# Patient Record
Sex: FEMALE | Race: WHITE | NOT HISPANIC OR LATINO | Employment: FULL TIME | URBAN - METROPOLITAN AREA
[De-identification: names, ages, dates, MRNs, and addresses within clinical notes are randomized per-mention and may not be internally consistent; named-entity substitution may affect disease eponyms.]

---

## 2023-05-15 NOTE — PROGRESS NOTES
Subjective   Vandana Woods is a 62 y.o. female who presents for patient is here for physical exam.  HPI  62-year-old female new to me, patient of Dr. Pinto history of hypothyroidism generalized insomnia generalized allergies hypertension patient is here for follow-up become established and the patient voices no major minor complaints denies fever chills cough nausea vomiting. Recent screening blood work at  screening normal lipid   Review of Systems  10 system review pertinent as above  Objective     Visit Vitals  /80   Pulse 72   Temp 36.8 °C (98.2 °F)   Resp 16      Physical Exam  HEENT: Atraumatic normocephalic the pupils are equal and round and reactive to light the sclerae nonicteric extraocular motion are intact.  Neck: Is supple without JVD no carotid bruits the trachea is midline there are no masses pulses are equal and bilateral with normal upstroke.  Skin: Normal.  Skin good texture.  Moist.  Good turgor.  No lesions, no rashes.  Lymph: No lymphadenopathy appreciated, no masses, no lesions  Lungs: Are clear to auscultation and percussion, good breath sounds bilaterally, no rhonchi, no wheezing, good diaphragmatic excursion.  Heart: Normal rate and normal rhythm S1, S2, no S3, no gallop, murmur or rub.  Abdomen: Soft, nontender, no organomegaly, good bowel sounds.    Extremities: Full range of motion, good pulses bilateral.  No cyanosis, no clubbing or edema.  Neuro: Cranial nerves II-XII are grossly intact there is no sensory or motor deficits.  Able to move all extremities.      Assessment/Plan     Here for a physical    Fasting blood work    CBC BMP LIPID Vitamin D TSH    Hypothyroidism  Continue current medications  Report any changes such as weight gain or weight loss  Continue to exercise regularly      Prevention    Colonoscopy declined insurance ,Cologurd patient claims insurance would no pay  Mammogram needed tri pont June 28 /2022, req given for 07/2023  Bone density needed, done at   04/2023 shows osteopenia health fair     GYN needed  insurance would no cover per patient    Immunization, reviewed non epic    Hypertension  No added salt diet, do not and salt to your food  Try to exercise every other day for 30 minutes  Continue current medications    Tobacco abuse   Will need to stop smoking immediately   Consulted at great length greater than 10 min  Patient is not ready to quite  Problem List Items Addressed This Visit          Circulatory    Benign essential hypertension - Primary    Hypertension       Endocrine/Metabolic    Hypothyroidism    Relevant Orders    TSH       Other    Status post CVA    Tobacco abuse    Physical exam         Desmond Medina MD

## 2023-05-17 ENCOUNTER — OFFICE VISIT (OUTPATIENT)
Dept: PRIMARY CARE | Facility: CLINIC | Age: 63
End: 2023-05-17
Payer: COMMERCIAL

## 2023-05-17 VITALS
HEART RATE: 72 BPM | BODY MASS INDEX: 24.8 KG/M2 | WEIGHT: 140 LBS | DIASTOLIC BLOOD PRESSURE: 80 MMHG | RESPIRATION RATE: 16 BRPM | SYSTOLIC BLOOD PRESSURE: 122 MMHG | TEMPERATURE: 98.2 F | HEIGHT: 63 IN

## 2023-05-17 DIAGNOSIS — I10 BENIGN ESSENTIAL HYPERTENSION: ICD-10-CM

## 2023-05-17 DIAGNOSIS — F51.01 PRIMARY INSOMNIA: ICD-10-CM

## 2023-05-17 DIAGNOSIS — I10 BENIGN ESSENTIAL HYPERTENSION: Primary | ICD-10-CM

## 2023-05-17 DIAGNOSIS — Z72.0 TOBACCO ABUSE: ICD-10-CM

## 2023-05-17 DIAGNOSIS — Z00.00 PHYSICAL EXAM: ICD-10-CM

## 2023-05-17 DIAGNOSIS — Z12.31 ENCOUNTER FOR SCREENING MAMMOGRAM FOR MALIGNANT NEOPLASM OF BREAST: ICD-10-CM

## 2023-05-17 DIAGNOSIS — Z86.73 STATUS POST CVA: ICD-10-CM

## 2023-05-17 DIAGNOSIS — E03.9 HYPOTHYROIDISM, UNSPECIFIED TYPE: ICD-10-CM

## 2023-05-17 DIAGNOSIS — M85.872 OSTEOPENIA OF BOTH FEET: ICD-10-CM

## 2023-05-17 DIAGNOSIS — I10 HYPERTENSION, UNSPECIFIED TYPE: ICD-10-CM

## 2023-05-17 DIAGNOSIS — J30.2 SEASONAL ALLERGIES: ICD-10-CM

## 2023-05-17 DIAGNOSIS — E03.9 HYPOTHYROIDISM, UNSPECIFIED TYPE: Primary | ICD-10-CM

## 2023-05-17 DIAGNOSIS — M85.871 OSTEOPENIA OF BOTH FEET: ICD-10-CM

## 2023-05-17 LAB
CALCIDIOL (25 OH VITAMIN D3) (NG/ML) IN SER/PLAS: 29 NG/ML
THYROTROPIN (MIU/L) IN SER/PLAS BY DETECTION LIMIT <= 0.05 MIU/L: 0.16 MIU/L (ref 0.44–3.98)

## 2023-05-17 PROCEDURE — 84443 ASSAY THYROID STIM HORMONE: CPT

## 2023-05-17 PROCEDURE — 3074F SYST BP LT 130 MM HG: CPT | Performed by: INTERNAL MEDICINE

## 2023-05-17 PROCEDURE — 80048 BASIC METABOLIC PNL TOTAL CA: CPT | Performed by: INTERNAL MEDICINE

## 2023-05-17 PROCEDURE — 80061 LIPID PANEL: CPT | Performed by: INTERNAL MEDICINE

## 2023-05-17 PROCEDURE — 4004F PT TOBACCO SCREEN RCVD TLK: CPT | Performed by: INTERNAL MEDICINE

## 2023-05-17 PROCEDURE — 84443 ASSAY THYROID STIM HORMONE: CPT | Performed by: INTERNAL MEDICINE

## 2023-05-17 PROCEDURE — 99396 PREV VISIT EST AGE 40-64: CPT | Performed by: INTERNAL MEDICINE

## 2023-05-17 PROCEDURE — 3079F DIAST BP 80-89 MM HG: CPT | Performed by: INTERNAL MEDICINE

## 2023-05-17 PROCEDURE — 36415 COLL VENOUS BLD VENIPUNCTURE: CPT

## 2023-05-17 PROCEDURE — 82306 VITAMIN D 25 HYDROXY: CPT

## 2023-05-17 PROCEDURE — 85025 COMPLETE CBC W/AUTO DIFF WBC: CPT | Performed by: INTERNAL MEDICINE

## 2023-05-17 PROCEDURE — 82306 VITAMIN D 25 HYDROXY: CPT | Performed by: INTERNAL MEDICINE

## 2023-05-17 RX ORDER — MONTELUKAST SODIUM 10 MG/1
10 TABLET ORAL DAILY
Qty: 30 TABLET | Refills: 11 | Status: SHIPPED | OUTPATIENT
Start: 2023-05-17 | End: 2024-05-01 | Stop reason: SDUPTHER

## 2023-05-17 RX ORDER — MONTELUKAST SODIUM 10 MG/1
1 TABLET ORAL DAILY
COMMUNITY
End: 2023-05-17 | Stop reason: SDUPTHER

## 2023-05-17 RX ORDER — MIRTAZAPINE 15 MG/1
15 TABLET, FILM COATED ORAL NIGHTLY
Qty: 30 TABLET | Refills: 11 | Status: SHIPPED | OUTPATIENT
Start: 2023-05-17 | End: 2024-05-01 | Stop reason: SDUPTHER

## 2023-05-17 RX ORDER — MIRTAZAPINE 15 MG/1
1 TABLET, FILM COATED ORAL NIGHTLY
COMMUNITY
Start: 2016-06-20 | End: 2023-05-17 | Stop reason: SDUPTHER

## 2023-05-17 RX ORDER — LEVOTHYROXINE SODIUM 125 UG/1
1 TABLET ORAL DAILY
COMMUNITY
End: 2023-05-17 | Stop reason: SDUPTHER

## 2023-05-17 RX ORDER — LEVOTHYROXINE SODIUM 125 UG/1
125 TABLET ORAL DAILY
Qty: 30 TABLET | Refills: 11 | Status: SHIPPED | OUTPATIENT
Start: 2023-05-17 | End: 2023-05-25 | Stop reason: ALTCHOICE

## 2023-05-17 RX ORDER — TRIAMTERENE AND HYDROCHLOROTHIAZIDE 37.5; 25 MG/1; MG/1
1 CAPSULE ORAL DAILY
Qty: 30 CAPSULE | Refills: 11 | Status: SHIPPED | OUTPATIENT
Start: 2023-05-17 | End: 2024-05-01 | Stop reason: SDUPTHER

## 2023-05-17 RX ORDER — LORATADINE 10 MG/1
1 TABLET ORAL DAILY
COMMUNITY
Start: 2022-05-27 | End: 2024-05-01 | Stop reason: SDUPTHER

## 2023-05-17 RX ORDER — TRIAMTERENE AND HYDROCHLOROTHIAZIDE 37.5; 25 MG/1; MG/1
1 CAPSULE ORAL DAILY
COMMUNITY
Start: 2014-10-17 | End: 2023-05-17 | Stop reason: SDUPTHER

## 2023-05-17 ASSESSMENT — PAIN SCALES - GENERAL: PAINLEVEL: 0-NO PAIN

## 2023-05-22 ENCOUNTER — TELEPHONE (OUTPATIENT)
Dept: PRIMARY CARE | Facility: CLINIC | Age: 63
End: 2023-05-22
Payer: COMMERCIAL

## 2023-05-24 NOTE — TELEPHONE ENCOUNTER
Informed pt of her blood test results, I informed her that she is to return in 6 months to repeat her blood test results. She has some concerns stating her insurance will not allow her to come back that soon, she would like to speak with you regarding her thyroid levels. Please call tomorrow before 2:30 if possible.

## 2023-05-25 DIAGNOSIS — E03.9 HYPOTHYROIDISM, UNSPECIFIED TYPE: ICD-10-CM

## 2023-05-25 RX ORDER — LEVOTHYROXINE SODIUM 112 UG/1
112 TABLET ORAL DAILY
Qty: 30 TABLET | Refills: 11 | Status: SHIPPED | OUTPATIENT
Start: 2023-05-25 | End: 2024-05-01 | Stop reason: SDUPTHER

## 2023-11-09 ENCOUNTER — TELEMEDICINE (OUTPATIENT)
Dept: PRIMARY CARE | Facility: CLINIC | Age: 63
End: 2023-11-09
Payer: COMMERCIAL

## 2023-11-09 DIAGNOSIS — J01.00 ACUTE NON-RECURRENT MAXILLARY SINUSITIS: Primary | ICD-10-CM

## 2023-11-09 PROCEDURE — 99213 OFFICE O/P EST LOW 20 MIN: CPT | Performed by: INTERNAL MEDICINE

## 2023-11-09 RX ORDER — AZITHROMYCIN 250 MG/1
TABLET, FILM COATED ORAL
Qty: 6 TABLET | Refills: 0 | Status: SHIPPED | OUTPATIENT
Start: 2023-11-09 | End: 2023-11-14

## 2023-11-09 ASSESSMENT — ENCOUNTER SYMPTOMS: COUGH: 1

## 2023-11-09 NOTE — PROGRESS NOTES
Subjective   Vandana Woods is a 63 y.o. female who presents for complaining of sinus congestion.  Cough  The current episode started in the past 7 days. The problem has been unchanged. The problem occurs hourly. The cough is Non-productive.     Vandana is a 63 female with a history of hypothyroidism, tobacco abuse, complaining of sinus congestion for the last 2 weeks pressure in the sinus cavity, discharge clear discharge, occasional cough without fever chills no body aches no nausea vomiting no diaphoresis no abdominal pain no constipation diarrhea.  In addition patient Synthroid dose was decreased to 112 mcg daily, she is feeling fine on this new dose.  Review of Systems   Respiratory:  Positive for cough.      10 systems reviewed pertinent as above  Objective   Virtual visit  There were no vitals taken for this visit.   Physical Exam  Virtual visit      Assessment/Plan     Virtual visit    Acute sinusitis  Sinus congestion postnasal drip  With associated cough  In the setting of tobacco abuse  I suspect that patient may have progressed to bacterial bronchitis  I also suspect this patient may have developed bacterial sinusitis  Currently probably most consistent with viral  I will start the patient on azithromycin as directed  Fluids rest Tylenol if fever or chills  Call if not better    Hypothyroidism  Continue current medications  Report any changes such as weight gain or weight loss  Continue to exercise regularly  Discussed new dose of Synthroid of 112 mcg  Patient will come in in 4 weeks for a new TSH level    Tobacco abuse  Discussed smoking cessation with patient  Patient is not ready to quit smoking at this time      Problem List Items Addressed This Visit    None  Visit Diagnoses       Acute non-recurrent maxillary sinusitis    -  Primary    Relevant Medications    azithromycin (Zithromax) 250 mg tablet              Desmond Medina MD

## 2023-11-10 ENCOUNTER — HOSPITAL ENCOUNTER (OUTPATIENT)
Dept: RADIOLOGY | Facility: HOSPITAL | Age: 63
Discharge: HOME | End: 2023-11-10
Payer: COMMERCIAL

## 2023-11-10 ENCOUNTER — DOCUMENTATION (OUTPATIENT)
Dept: PRIMARY CARE | Facility: CLINIC | Age: 63
End: 2023-11-10
Payer: COMMERCIAL

## 2023-11-10 VITALS — HEIGHT: 65 IN | BODY MASS INDEX: 22.49 KG/M2 | WEIGHT: 135 LBS

## 2023-11-10 DIAGNOSIS — Z12.31 ENCOUNTER FOR SCREENING MAMMOGRAM FOR MALIGNANT NEOPLASM OF BREAST: ICD-10-CM

## 2023-11-10 PROCEDURE — 77063 BREAST TOMOSYNTHESIS BI: CPT

## 2023-12-06 NOTE — PROGRESS NOTES
Subjective   Vandana Woods is a 63 y.o. female who presents for complaining of sinus congestion.    Vandana is a 63 female with a history of hypothyroidism, tobacco abuse, complaining of sinus congestion for the last 2 weeks pressure in the sinus cavity, discharge clear discharge, occasional cough without fever chills no body aches no nausea vomiting no diaphoresis no abdominal pain no constipation diarrhea.  In addition patient Synthroid dose was decreased to 112 mcg daily, she is feeling fine on this new dose.    ROS:  10 systems are pertinent as above    10 systems reviewed pertinent as above  Objective     Visit Vitals  /78   Pulse 78   Temp 36.5 °C (97.7 °F)   Resp 16      Physical Exam  HEENT: Atraumatic normocephalic the pupils are equal and round and reactive to light the sclerae nonicteric extraocular motion are intact.  Neck: Is supple without JVD no carotid bruits the trachea is midline there are no masses pulses are equal and bilateral with normal upstroke.  Skin: Normal.  Skin good texture.  Moist.  Good turgor.  No lesions, no rashes.  Lymph: No lymphadenopathy appreciated, no masses, no lesions  Lungs: Are clear to auscultation and percussion, good breath sounds bilaterally, no rhonchi, no wheezing, good diaphragmatic excursion.  Heart: Normal rate and normal rhythm S1, S2, no S3, no gallop, murmur or rub.  Abdomen: Soft, nontender, no organomegaly, good bowel sounds.    Extremities: Full range of motion, good pulses bilateral.  No cyanosis, no clubbing or edema.  Neuro: Cranial nerves II-XII are grossly intact there is no sensory or motor deficits.  Able to move all extremities.      Assessment/Plan     Patient is here for follow-up       Hypothyroidism  Continue current medications  Report any changes such as weight gain or weight loss  Continue to exercise regularly  Discussed new dose of Synthroid of 112 mcg  Patient will come in in 4 weeks for a new TSH level    Tobacco abuse  Discussed  smoking cessation with patient  Patient is not ready to quit smoking at this time    Post nasal drip sinusitis  Must stop smoking       Problem List Items Addressed This Visit       Hypothyroidism - Primary    Relevant Orders    TSH       Desmond Medina MD

## 2023-12-11 ENCOUNTER — OFFICE VISIT (OUTPATIENT)
Dept: PRIMARY CARE | Facility: CLINIC | Age: 63
End: 2023-12-11
Payer: COMMERCIAL

## 2023-12-11 VITALS
BODY MASS INDEX: 23.66 KG/M2 | HEIGHT: 65 IN | RESPIRATION RATE: 16 BRPM | SYSTOLIC BLOOD PRESSURE: 120 MMHG | WEIGHT: 142 LBS | TEMPERATURE: 97.7 F | HEART RATE: 78 BPM | DIASTOLIC BLOOD PRESSURE: 78 MMHG

## 2023-12-11 DIAGNOSIS — E03.9 HYPOTHYROIDISM, UNSPECIFIED TYPE: Primary | ICD-10-CM

## 2023-12-11 PROCEDURE — 3074F SYST BP LT 130 MM HG: CPT | Performed by: INTERNAL MEDICINE

## 2023-12-11 PROCEDURE — 36415 COLL VENOUS BLD VENIPUNCTURE: CPT

## 2023-12-11 PROCEDURE — 99214 OFFICE O/P EST MOD 30 MIN: CPT | Performed by: INTERNAL MEDICINE

## 2023-12-11 PROCEDURE — 4004F PT TOBACCO SCREEN RCVD TLK: CPT | Performed by: INTERNAL MEDICINE

## 2023-12-11 PROCEDURE — 84443 ASSAY THYROID STIM HORMONE: CPT

## 2023-12-11 PROCEDURE — 3078F DIAST BP <80 MM HG: CPT | Performed by: INTERNAL MEDICINE

## 2023-12-11 ASSESSMENT — ENCOUNTER SYMPTOMS
LOSS OF SENSATION IN FEET: 0
OCCASIONAL FEELINGS OF UNSTEADINESS: 0
DEPRESSION: 0

## 2023-12-11 ASSESSMENT — PAIN SCALES - GENERAL: PAINLEVEL: 0-NO PAIN

## 2023-12-12 LAB — TSH SERPL-ACNC: 2.05 MIU/L (ref 0.44–3.98)

## 2023-12-29 ENCOUNTER — TELEMEDICINE (OUTPATIENT)
Dept: PRIMARY CARE | Facility: CLINIC | Age: 63
End: 2023-12-29
Payer: COMMERCIAL

## 2023-12-29 DIAGNOSIS — J20.9 ACUTE BRONCHITIS, UNSPECIFIED ORGANISM: Primary | ICD-10-CM

## 2023-12-29 PROCEDURE — 99213 OFFICE O/P EST LOW 20 MIN: CPT | Performed by: INTERNAL MEDICINE

## 2023-12-29 RX ORDER — AZITHROMYCIN 250 MG/1
TABLET, FILM COATED ORAL
Qty: 6 TABLET | Refills: 1 | Status: SHIPPED | OUTPATIENT
Start: 2023-12-29 | End: 2024-01-03

## 2023-12-29 ASSESSMENT — ENCOUNTER SYMPTOMS
WEIGHT LOSS: 0
HEADACHES: 1
WHEEZING: 1
RHINORRHEA: 0
HEMOPTYSIS: 0
SHORTNESS OF BREATH: 0
SWEATS: 0
MYALGIAS: 1
COUGH: 1
HEARTBURN: 0
SORE THROAT: 0
CHILLS: 0
FEVER: 0

## 2024-04-29 NOTE — PROGRESS NOTES
Subjective   Vandana Woods is a 63 y.o. female who presents for physical exam fasting blood work .    Vandana is a 63 female with a history of hypothyroidism, tobacco abuse, complaining of sinus congestion for the last 2 weeks pressure in the sinus cavity, discharge clear discharge, occasional cough without fever chills no body aches no nausea vomiting no diaphoresis no abdominal pain no constipation diarrhea. On  Synthroid dose was decreased to 112 mcg daily, she is feeling fine on this new dose. Here for a physical    ROS:  10 systems are pertinent as above    10 systems reviewed pertinent as above  Objective     There were no vitals taken for this visit.     Physical Exam  HEENT: Atraumatic normocephalic the pupils are equal and round and reactive to light the sclerae nonicteric extraocular motion are intact.  Neck: Is supple without JVD no carotid bruits the trachea is midline there are no masses pulses are equal and bilateral with normal upstroke.  Skin: Normal.  Skin good texture.  Moist.  Good turgor.  No lesions, no rashes.  Lymph: No lymphadenopathy appreciated, no masses, no lesions  Lungs: Are clear to auscultation and percussion, good breath sounds bilaterally, no rhonchi, no wheezing, good diaphragmatic excursion.  Heart: Normal rate and normal rhythm S1, S2, no S3, no gallop, murmur or rub.  Abdomen: Soft, nontender, no organomegaly, good bowel sounds.    Extremities: Full range of motion, good pulses bilateral.  No cyanosis, no clubbing or edema.  Neuro: Cranial nerves II-XII are grossly intact there is no sensory or motor deficits.  Able to move all extremities.      Assessment/Plan     Patient is here for physical exam     Fasting blood work    Cbc bmp lipid ast alt vit d  TSH    Prevention  Colonoscopy decline future  Mammogram 2023 nov    Gyn  Referral     Declined CT Lung Cancer screening     Hypothyroidism  Continue current medications  Report any changes such as weight gain or weight  loss  Continue to exercise regularly  Discussed new dose of Synthroid of 112 mcg  Patient will come in in 4 weeks for a new TSH level    Tobacco abuse  Discussed smoking cessation with patient  Patient is not ready to quit smoking at this time      Problem List Items Addressed This Visit       Benign essential hypertension    Hypertension - Primary    Hypothyroidism    Relevant Medications    levothyroxine (Synthroid, Levoxyl) 112 mcg tablet     Other Visit Diagnoses       Primary insomnia        Relevant Medications    mirtazapine (Remeron) 15 mg tablet    Seasonal allergies        Relevant Medications    montelukast (Singulair) 10 mg tablet    loratadine (Claritin) 10 mg tablet              Desmond Medina MD

## 2024-05-01 ENCOUNTER — OFFICE VISIT (OUTPATIENT)
Dept: OTOLARYNGOLOGY | Facility: CLINIC | Age: 64
End: 2024-05-01
Payer: COMMERCIAL

## 2024-05-01 ENCOUNTER — OFFICE VISIT (OUTPATIENT)
Dept: PRIMARY CARE | Facility: CLINIC | Age: 64
End: 2024-05-01
Payer: COMMERCIAL

## 2024-05-01 VITALS — HEIGHT: 65 IN | BODY MASS INDEX: 23.32 KG/M2 | WEIGHT: 140 LBS

## 2024-05-01 VITALS — WEIGHT: 138 LBS | BODY MASS INDEX: 22.99 KG/M2 | HEIGHT: 65 IN

## 2024-05-01 DIAGNOSIS — E03.9 HYPOTHYROIDISM, UNSPECIFIED TYPE: ICD-10-CM

## 2024-05-01 DIAGNOSIS — E78.5 DYSLIPIDEMIA: ICD-10-CM

## 2024-05-01 DIAGNOSIS — I10 HYPERTENSION, UNSPECIFIED TYPE: Primary | ICD-10-CM

## 2024-05-01 DIAGNOSIS — F51.01 PRIMARY INSOMNIA: ICD-10-CM

## 2024-05-01 DIAGNOSIS — H61.20 CERUMEN IN AUDITORY CANAL ON EXAMINATION: Primary | ICD-10-CM

## 2024-05-01 DIAGNOSIS — J30.2 SEASONAL ALLERGIES: ICD-10-CM

## 2024-05-01 DIAGNOSIS — I10 BENIGN ESSENTIAL HYPERTENSION: ICD-10-CM

## 2024-05-01 LAB
APPEARANCE UR: CLEAR
BILIRUB UR QL STRIP: NEGATIVE
COLOR UR: YELLOW
GLUCOSE UR STRIP-MCNC: NEGATIVE MG/DL
HGB UR QL STRIP: ABNORMAL
KETONES UR STRIP-MCNC: NEGATIVE MG/DL
LEUKOCYTE ESTERASE UR QL STRIP: ABNORMAL
NITRITE UR QL STRIP: NEGATIVE
PH UR STRIP: 7 [PH]
PROT UR STRIP-MCNC: NEGATIVE MG/DL
SP GR UR STRIP.AUTO: 1.01
UROBILINOGEN UR STRIP-ACNC: 0.2 E.U./DL

## 2024-05-01 PROCEDURE — 99213 OFFICE O/P EST LOW 20 MIN: CPT | Performed by: OTOLARYNGOLOGY

## 2024-05-01 PROCEDURE — 84450 TRANSFERASE (AST) (SGOT): CPT | Performed by: INTERNAL MEDICINE

## 2024-05-01 PROCEDURE — 81003 URINALYSIS AUTO W/O SCOPE: CPT | Performed by: INTERNAL MEDICINE

## 2024-05-01 PROCEDURE — 84443 ASSAY THYROID STIM HORMONE: CPT | Performed by: INTERNAL MEDICINE

## 2024-05-01 PROCEDURE — 99396 PREV VISIT EST AGE 40-64: CPT | Performed by: INTERNAL MEDICINE

## 2024-05-01 PROCEDURE — 80061 LIPID PANEL: CPT | Performed by: INTERNAL MEDICINE

## 2024-05-01 PROCEDURE — 85025 COMPLETE CBC W/AUTO DIFF WBC: CPT | Performed by: INTERNAL MEDICINE

## 2024-05-01 PROCEDURE — 84460 ALANINE AMINO (ALT) (SGPT): CPT | Performed by: INTERNAL MEDICINE

## 2024-05-01 RX ORDER — MIRTAZAPINE 15 MG/1
15 TABLET, FILM COATED ORAL NIGHTLY
Qty: 90 TABLET | Refills: 3 | Status: SHIPPED | OUTPATIENT
Start: 2024-05-01 | End: 2025-05-01

## 2024-05-01 RX ORDER — MONTELUKAST SODIUM 10 MG/1
10 TABLET ORAL DAILY
Qty: 90 TABLET | Refills: 3 | Status: SHIPPED | OUTPATIENT
Start: 2024-05-01 | End: 2025-05-01

## 2024-05-01 RX ORDER — TRIAMTERENE AND HYDROCHLOROTHIAZIDE 37.5; 25 MG/1; MG/1
1 CAPSULE ORAL DAILY
Qty: 30 CAPSULE | Refills: 11 | Status: SHIPPED | OUTPATIENT
Start: 2024-05-01

## 2024-05-01 RX ORDER — LORATADINE 10 MG/1
10 TABLET ORAL DAILY
Qty: 90 TABLET | Refills: 3 | Status: SHIPPED | OUTPATIENT
Start: 2024-05-01 | End: 2025-05-01

## 2024-05-01 RX ORDER — LEVOTHYROXINE SODIUM 112 UG/1
112 TABLET ORAL DAILY
Qty: 90 TABLET | Refills: 3 | Status: SHIPPED | OUTPATIENT
Start: 2024-05-01 | End: 2025-05-01

## 2024-05-01 ASSESSMENT — ENCOUNTER SYMPTOMS
DEPRESSION: 0
LOSS OF SENSATION IN FEET: 0
OCCASIONAL FEELINGS OF UNSTEADINESS: 0

## 2024-05-01 NOTE — TELEPHONE ENCOUNTER
Pt is requesting a call back and  is refusing to  prescription for synthroid because she said she saw her results were in and she feels that synthroid was too low so she wanted a call back to verify if she should still get it due to the results being low.

## 2024-05-01 NOTE — PROGRESS NOTES
NATHANIEL  Vandana Woods is a 63 y.o. female here for annual cerumen management.  Denies otalgia and otorrhea.  Generally normal hearing at baseline.  Complains of ear itching      Past Medical History:   Diagnosis Date    Abnormal findings on diagnostic imaging of other specified body structures 04/15/2014    Abnormal MRI    Altered mental status, unspecified 06/20/2016    Abnormal mental state    Disease of spinal cord, unspecified (Multi)     Myelopathy    Encounter for screening, unspecified     Screening due    Epidermal cyst     Epidermal cyst of ear    Other bursitis, not elsewhere classified, unspecified site 06/30/2017    Traumatic bursitis    Other conditions influencing health status     Herniated disc    Other specified noninfective gastroenteritis and colitis     Lymphocytic colitis    Pain in right wrist 06/30/2017    Wrist pain, acute, right    Pain in throat 06/27/2014    Pain in throat    Personal history of other diseases of the digestive system     History of irritable bowel syndrome    Personal history of other diseases of the musculoskeletal system and connective tissue     History of low back pain    Personal history of other diseases of the musculoskeletal system and connective tissue     History of low back pain    Personal history of other diseases of the musculoskeletal system and connective tissue     History of radiculopathy    Personal history of other diseases of the musculoskeletal system and connective tissue     Personal history of fibromyalgia    Personal history of other diseases of the musculoskeletal system and connective tissue     Personal history of osteoporosis    Personal history of other diseases of the nervous system and sense organs     History of neuropathy    Personal history of other diseases of the nervous system and sense organs 03/28/2016    History of impacted cerumen    Personal history of other diseases of the respiratory system     History of acute bronchitis     "Personal history of other diseases of the respiratory system     History of chronic obstructive lung disease    Personal history of other diseases of the respiratory system     History of allergic rhinitis    Personal history of other diseases of the respiratory system     Personal history of sinusitis    Personal history of other diseases of the respiratory system     Personal history of asthma    Personal history of other diseases of the respiratory system 10/26/2015    History of acute bronchitis    Personal history of other diseases of the respiratory system 10/21/2015    History of sore throat    Personal history of other endocrine, nutritional and metabolic disease     History of hyperlipidemia    Personal history of other endocrine, nutritional and metabolic disease     History of hypothyroidism    Personal history of other specified conditions     History of diarrhea    Personal history of other specified conditions 02/26/2014    History of headache    Personal history of other specified conditions 06/27/2014    History of dizziness            Medications:     Current Outpatient Medications:     levothyroxine (Synthroid, Levoxyl) 112 mcg tablet, Take 1 tablet (112 mcg) by mouth once daily., Disp: 90 tablet, Rfl: 3    loratadine (Claritin) 10 mg tablet, Take 1 tablet (10 mg) by mouth once daily., Disp: 90 tablet, Rfl: 3    mirtazapine (Remeron) 15 mg tablet, Take 1 tablet (15 mg) by mouth once daily at bedtime., Disp: 90 tablet, Rfl: 3    montelukast (Singulair) 10 mg tablet, Take 1 tablet (10 mg) by mouth once daily., Disp: 90 tablet, Rfl: 3    triamterene-hydrochlorothiazid (Dyazide) 37.5-25 mg capsule, Take 1 capsule by mouth once daily., Disp: 30 capsule, Rfl: 11     Allergies:  Allergies   Allergen Reactions    Cefaclor Rash        Physical Exam:  Last Recorded Vitals  Height 1.638 m (5' 4.5\"), weight 62.6 kg (138 lb).  General:     General appearance: Well-developed, well-nourished in no acute " distress.       Voice:  normal       Head/face: Normal appearance; nontender to palpation     Facial nerve function: Normal and symmetric bilaterally.    Oral/oropharynx:     Oral vestibule: Normal labial and gingival mucosa     Tongue/floor of mouth: Normal without lesion     Oropharynx: Clear.  No lesions present of the hard/soft palate, posterior pharynx    Neck:     Neck: Normal appearance, trachea midline     Salivary glands: Normal to palpation bilaterally     Lymph nodes: No cervical lymphadenopathy to palpation     Thyroid: No thyromegaly.  No palpable nodules     Range of motion: Normal    Neurological:     Cortical functions: Alert and oriented x3, appropriate affect       Larynx/hypopharynx:     Laryngeal findings: Mirror exam inadequate or limited secondary to enlarged base of tongue and/or excessive gagging    Ear:     Ear canal: Normal bilaterally after suctioning moderately occlusive cerumen bilaterally     Tympanic membrane: Intact and mobile bilaterally     Pinna: Normal bilaterally     Hearing:  Gross hearing assessment normal by voice    Nose:     Visualized using: Anterior rhinoscopy     Nasopharynx: Inadequate mirror exam secondary to gag, anatomy.       Nasal dorsum: Nontraumatic midline appearance     Septum: Midline     Inferior turbinates: Normally sized     Mucosa: Bilateral, pink, normal appearing       ASSESSMENT/PLAN:  Ears cleaned bilaterally.  Relief.  Offered fluocinolone oil.  She would like to defer.  Recheck 6 months, sooner as needed      Sean German MD

## 2024-05-01 NOTE — TELEPHONE ENCOUNTER
Pt stated that Dr forgot to fill triamterene-hydrochlorothiazid (Dyazide) 37.5-25 mg capsule [[id number]], she requested it to be sent to [facility] on file.

## 2024-05-02 ENCOUNTER — TELEPHONE (OUTPATIENT)
Dept: PRIMARY CARE | Facility: CLINIC | Age: 64
End: 2024-05-02
Payer: COMMERCIAL

## 2024-05-02 DIAGNOSIS — I10 HYPERTENSION, UNSPECIFIED TYPE: Primary | ICD-10-CM

## 2024-05-02 NOTE — TELEPHONE ENCOUNTER
Pt is requesting aphone call regarding her results from yesterday. Pt advised me that she is not going to  her rx for Synthroid until she gets a confirmation on if her levels are okay or not.

## 2024-06-14 ENCOUNTER — TELEPHONE (OUTPATIENT)
Dept: PRIMARY CARE | Facility: CLINIC | Age: 64
End: 2024-06-14
Payer: COMMERCIAL
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
## 2024-08-06 ENCOUNTER — APPOINTMENT (OUTPATIENT)
Dept: PRIMARY CARE | Facility: CLINIC | Age: 64
End: 2024-08-06
Payer: COMMERCIAL

## 2024-08-06 DIAGNOSIS — M54.42 ACUTE LEFT-SIDED LOW BACK PAIN WITH LEFT-SIDED SCIATICA: Primary | ICD-10-CM

## 2024-08-06 DIAGNOSIS — E03.9 HYPOTHYROIDISM, UNSPECIFIED TYPE: ICD-10-CM

## 2024-08-06 PROCEDURE — 99214 OFFICE O/P EST MOD 30 MIN: CPT | Performed by: INTERNAL MEDICINE

## 2024-08-06 RX ORDER — DICLOFENAC SODIUM 75 MG/1
75 TABLET, DELAYED RELEASE ORAL 2 TIMES DAILY
Qty: 60 TABLET | Refills: 0 | Status: SHIPPED | OUTPATIENT
Start: 2024-08-06 | End: 2024-09-05

## 2024-08-06 RX ORDER — METHYLPREDNISOLONE 4 MG/1
TABLET ORAL
Qty: 21 TABLET | Refills: 0 | Status: SHIPPED | OUTPATIENT
Start: 2024-08-06 | End: 2024-08-13

## 2024-08-13 ENCOUNTER — TELEPHONE (OUTPATIENT)
Dept: PRIMARY CARE | Facility: CLINIC | Age: 64
End: 2024-08-13
Payer: COMMERCIAL

## 2024-08-13 DIAGNOSIS — Z12.31 ENCOUNTER FOR SCREENING MAMMOGRAM FOR MALIGNANT NEOPLASM OF BREAST: Primary | ICD-10-CM

## 2024-11-06 ENCOUNTER — HOSPITAL ENCOUNTER (OUTPATIENT)
Dept: RADIOLOGY | Facility: HOSPITAL | Age: 64
Discharge: HOME | End: 2024-11-06
Payer: COMMERCIAL

## 2024-11-06 ENCOUNTER — APPOINTMENT (OUTPATIENT)
Dept: OTOLARYNGOLOGY | Facility: CLINIC | Age: 64
End: 2024-11-06
Payer: COMMERCIAL

## 2024-11-06 VITALS — TEMPERATURE: 97.3 F | BODY MASS INDEX: 24.32 KG/M2 | HEIGHT: 65 IN | WEIGHT: 146 LBS

## 2024-11-06 VITALS — HEIGHT: 64 IN | WEIGHT: 140 LBS | BODY MASS INDEX: 23.9 KG/M2

## 2024-11-06 DIAGNOSIS — Z12.31 ENCOUNTER FOR SCREENING MAMMOGRAM FOR MALIGNANT NEOPLASM OF BREAST: ICD-10-CM

## 2024-11-06 DIAGNOSIS — H61.20 CERUMEN IN AUDITORY CANAL ON EXAMINATION: Primary | ICD-10-CM

## 2024-11-06 PROCEDURE — 77063 BREAST TOMOSYNTHESIS BI: CPT | Performed by: RADIOLOGY

## 2024-11-06 PROCEDURE — 99213 OFFICE O/P EST LOW 20 MIN: CPT | Performed by: OTOLARYNGOLOGY

## 2024-11-06 PROCEDURE — 3008F BODY MASS INDEX DOCD: CPT | Performed by: OTOLARYNGOLOGY

## 2024-11-06 PROCEDURE — 77067 SCR MAMMO BI INCL CAD: CPT

## 2024-11-06 PROCEDURE — 77067 SCR MAMMO BI INCL CAD: CPT | Performed by: RADIOLOGY

## 2024-11-07 NOTE — PROGRESS NOTES
NATHANIEL  Vandana Woods is a 64 y.o. female here for annual cerumen management.  Denies otalgia and otorrhea.  Generally normal hearing at baseline.       Past Medical History:   Diagnosis Date    Abnormal findings on diagnostic imaging of other specified body structures 04/15/2014    Abnormal MRI    Altered mental status, unspecified 06/20/2016    Abnormal mental state    Disease of spinal cord, unspecified     Myelopathy    Encounter for screening, unspecified     Screening due    Epidermal cyst     Epidermal cyst of ear    Other bursitis, not elsewhere classified, unspecified site 06/30/2017    Traumatic bursitis    Other conditions influencing health status     Herniated disc    Other specified noninfective gastroenteritis and colitis     Lymphocytic colitis    Pain in right wrist 06/30/2017    Wrist pain, acute, right    Pain in throat 06/27/2014    Pain in throat    Personal history of other diseases of the digestive system     History of irritable bowel syndrome    Personal history of other diseases of the musculoskeletal system and connective tissue     History of low back pain    Personal history of other diseases of the musculoskeletal system and connective tissue     History of low back pain    Personal history of other diseases of the musculoskeletal system and connective tissue     History of radiculopathy    Personal history of other diseases of the musculoskeletal system and connective tissue     Personal history of fibromyalgia    Personal history of other diseases of the musculoskeletal system and connective tissue     Personal history of osteoporosis    Personal history of other diseases of the nervous system and sense organs     History of neuropathy    Personal history of other diseases of the nervous system and sense organs 03/28/2016    History of impacted cerumen    Personal history of other diseases of the respiratory system     History of acute bronchitis    Personal history of other diseases of  "the respiratory system     History of chronic obstructive lung disease    Personal history of other diseases of the respiratory system     History of allergic rhinitis    Personal history of other diseases of the respiratory system     Personal history of sinusitis    Personal history of other diseases of the respiratory system     Personal history of asthma    Personal history of other diseases of the respiratory system 10/26/2015    History of acute bronchitis    Personal history of other diseases of the respiratory system 10/21/2015    History of sore throat    Personal history of other endocrine, nutritional and metabolic disease     History of hyperlipidemia    Personal history of other endocrine, nutritional and metabolic disease     History of hypothyroidism    Personal history of other specified conditions     History of diarrhea    Personal history of other specified conditions 02/26/2014    History of headache    Personal history of other specified conditions 06/27/2014    History of dizziness            Medications:     Current Outpatient Medications:     levothyroxine (Synthroid, Levoxyl) 112 mcg tablet, Take 1 tablet (112 mcg) by mouth once daily., Disp: 90 tablet, Rfl: 3    loratadine (Claritin) 10 mg tablet, Take 1 tablet (10 mg) by mouth once daily., Disp: 90 tablet, Rfl: 3    mirtazapine (Remeron) 15 mg tablet, Take 1 tablet (15 mg) by mouth once daily at bedtime., Disp: 90 tablet, Rfl: 3    montelukast (Singulair) 10 mg tablet, Take 1 tablet (10 mg) by mouth once daily., Disp: 90 tablet, Rfl: 3    triamterene-hydrochlorothiazid (Dyazide) 37.5-25 mg capsule, Take 1 capsule by mouth once daily., Disp: 30 capsule, Rfl: 11     Allergies:  Allergies   Allergen Reactions    Cefaclor Rash        Physical Exam:  Last Recorded Vitals  Temperature 36.3 °C (97.3 °F), height 1.638 m (5' 4.5\"), weight 66.2 kg (146 lb).  General:     General appearance: Well-developed, well-nourished in no acute distress.       " Voice:  normal       Head/face: Normal appearance; nontender to palpation     Facial nerve function: Normal and symmetric bilaterally.    Oral/oropharynx:     Oral vestibule: Normal labial and gingival mucosa     Tongue/floor of mouth: Normal without lesion     Oropharynx: Clear.  No lesions present of the hard/soft palate, posterior pharynx    Neck:     Neck: Normal appearance, trachea midline     Salivary glands: Normal to palpation bilaterally     Lymph nodes: No cervical lymphadenopathy to palpation     Thyroid: No thyromegaly.  No palpable nodules     Range of motion: Normal    Neurological:     Cortical functions: Alert and oriented x3, appropriate affect       Larynx/hypopharynx:     Laryngeal findings: Mirror exam inadequate or limited secondary to enlarged base of tongue and/or excessive gagging    Ear:     Ear canal: Normal bilaterally after suctioning moderately occlusive cerumen bilaterally     Tympanic membrane: Intact and mobile bilaterally     Pinna: Normal bilaterally     Hearing:  Gross hearing assessment normal by voice    Nose:     Visualized using: Anterior rhinoscopy     Nasopharynx: Inadequate mirror exam secondary to gag, anatomy.       Nasal dorsum: Nontraumatic midline appearance     Septum: Midline     Inferior turbinates: Normally sized     Mucosa: Bilateral, pink, normal appearing       ASSESSMENT/PLAN:  Ears cleaned bilaterally.  Relief.  Recheck 6 months, sooner as needed      Sean German MD

## 2025-02-07 ENCOUNTER — TELEMEDICINE (OUTPATIENT)
Dept: PRIMARY CARE | Facility: CLINIC | Age: 65
End: 2025-02-07
Payer: COMMERCIAL

## 2025-02-07 DIAGNOSIS — B34.9 VIRAL ILLNESS: ICD-10-CM

## 2025-02-07 DIAGNOSIS — F51.01 PRIMARY INSOMNIA: ICD-10-CM

## 2025-02-07 DIAGNOSIS — I10 BENIGN ESSENTIAL HYPERTENSION: ICD-10-CM

## 2025-02-07 DIAGNOSIS — J01.00 ACUTE NON-RECURRENT MAXILLARY SINUSITIS: ICD-10-CM

## 2025-02-07 DIAGNOSIS — R09.89 RUNNY NOSE: ICD-10-CM

## 2025-02-07 DIAGNOSIS — J30.2 SEASONAL ALLERGIES: ICD-10-CM

## 2025-02-07 DIAGNOSIS — E03.9 HYPOTHYROIDISM, UNSPECIFIED TYPE: ICD-10-CM

## 2025-02-07 DIAGNOSIS — J20.9 ACUTE BRONCHITIS, UNSPECIFIED ORGANISM: Primary | ICD-10-CM

## 2025-02-07 PROCEDURE — 99213 OFFICE O/P EST LOW 20 MIN: CPT | Performed by: INTERNAL MEDICINE

## 2025-02-07 RX ORDER — MIRTAZAPINE 15 MG/1
15 TABLET, FILM COATED ORAL NIGHTLY
COMMUNITY
Start: 2025-02-07 | End: 2025-02-07 | Stop reason: WASHOUT

## 2025-02-07 RX ORDER — TRIAMTERENE AND HYDROCHLOROTHIAZIDE 37.5; 25 MG/1; MG/1
1 CAPSULE ORAL DAILY
COMMUNITY
Start: 2025-02-07 | End: 2025-02-07

## 2025-02-07 RX ORDER — MIRTAZAPINE 15 MG/1
15 TABLET, FILM COATED ORAL NIGHTLY
Qty: 30 TABLET | Refills: 11 | COMMUNITY
Start: 2025-02-07 | End: 2025-02-07 | Stop reason: WASHOUT

## 2025-02-07 RX ORDER — AZITHROMYCIN 250 MG/1
TABLET, FILM COATED ORAL
Qty: 6 TABLET | Refills: 0 | Status: SHIPPED | OUTPATIENT
Start: 2025-02-07 | End: 2025-02-12

## 2025-02-07 RX ORDER — LEVOTHYROXINE SODIUM 125 UG/1
125 TABLET ORAL DAILY
Qty: 30 TABLET | Refills: 11 | COMMUNITY
Start: 2025-02-07 | End: 2025-02-07

## 2025-02-07 RX ORDER — AZITHROMYCIN 250 MG/1
TABLET, FILM COATED ORAL
Qty: 6 TABLET | Refills: 1 | COMMUNITY
Start: 2025-02-07 | End: 2025-02-07

## 2025-02-07 RX ORDER — LORATADINE 10 MG/1
10 TABLET ORAL DAILY
COMMUNITY
Start: 2025-02-07 | End: 2025-02-07

## 2025-02-07 RX ORDER — TRIAMTERENE AND HYDROCHLOROTHIAZIDE 37.5; 25 MG/1; MG/1
1 CAPSULE ORAL DAILY
Qty: 30 CAPSULE | Refills: 11 | COMMUNITY
Start: 2025-02-07 | End: 2025-02-07

## 2025-02-07 RX ORDER — AZITHROMYCIN 250 MG/1
TABLET, FILM COATED ORAL
Qty: 6 TABLET | Refills: 0 | COMMUNITY
Start: 2025-02-07 | End: 2025-02-07

## 2025-02-07 RX ORDER — LEVOTHYROXINE SODIUM 125 UG/1
125 TABLET ORAL DAILY
COMMUNITY
Start: 2025-02-07 | End: 2025-02-07

## 2025-02-07 RX ORDER — LEVOTHYROXINE SODIUM 112 UG/1
112 TABLET ORAL DAILY
Qty: 30 TABLET | Refills: 11 | COMMUNITY
Start: 2025-02-07 | End: 2025-02-07

## 2025-02-07 RX ORDER — MONTELUKAST SODIUM 10 MG/1
10 TABLET ORAL DAILY
Qty: 30 TABLET | Refills: 11 | COMMUNITY
Start: 2025-02-07 | End: 2025-02-07

## 2025-02-07 RX ORDER — MONTELUKAST SODIUM 10 MG/1
10 TABLET ORAL DAILY
COMMUNITY
Start: 2025-02-07 | End: 2025-02-07

## 2025-02-07 ASSESSMENT — ENCOUNTER SYMPTOMS
MYALGIAS: 0
CHILLS: 0
HEADACHES: 1
HEMOPTYSIS: 0
SORE THROAT: 0
RHINORRHEA: 1
SWEATS: 0
HEARTBURN: 0
SHORTNESS OF BREATH: 0
COUGH: 1
WEIGHT LOSS: 0
FEVER: 0
WHEEZING: 0

## 2025-02-07 NOTE — PROGRESS NOTES
Subjective   Vandana Woods is a 64 y.o. female who presents for sinus congestion and a cough for the last 1 to 4 weeks.  .  Cough  This is a recurrent problem. The current episode started 1 to 4 weeks ago. The problem has been unchanged. The problem occurs hourly. The cough is Non-productive and productive of sputum. Associated symptoms include headaches, nasal congestion, postnasal drip and rhinorrhea. Pertinent negatives include no chest pain, chills, ear congestion, ear pain, fever, heartburn, hemoptysis, myalgias, rash, sore throat, shortness of breath, sweats, weight loss or wheezing. The symptoms are aggravated by cold air.     64-year-old female  history of hypothyroidism generalized insomnia generalized allergies hypertension patient is here for postnasal drip and sinus congestion, cough dry nonproductive, patient complaining of headaches from time to time and a mildly scratchy throat, there is no fever chills or bodyaches reported.  Patient worries as she states that she has bronchitis every year around this time.  Review of Systems   Constitutional:  Negative for chills, fever and weight loss.   HENT:  Positive for postnasal drip and rhinorrhea. Negative for ear pain and sore throat.    Respiratory:  Positive for cough. Negative for hemoptysis, shortness of breath and wheezing.    Cardiovascular:  Negative for chest pain.   Gastrointestinal:  Negative for heartburn.   Musculoskeletal:  Negative for myalgias.   Skin:  Negative for rash.   Neurological:  Positive for headaches.     10 system review pertinent as above  Objective   Virtual visit  There were no vitals taken for this visit.     Physical Exam  Virtual visit  Assessment/Plan     Virtual visit    Acute viral illness  Dry and productive cough  Denies fever chills or bodyaches  Denies sputum production  Patient is extremely worried  This will progress to bacterial infection  I suggested fluids and rest  Azithromycin as directed  And call if not  better    Patient voiced full understanding and agreed  All questions were answered to satisfaction        Here for a physical    Fasting blood work    CBC BMP LIPID Vitamin D TSH    Hypothyroidism  Continue current medications  Report any changes such as weight gain or weight loss  Continue to exercise regularly      Prevention    Colonoscopy declined insurance ,Cologurd patient claims insurance would no pay  Mammogram needed tri pont June 28 /2022, req given for 07/2023  Bone density needed, done at  04/2023 shows osteopenia [facility]     GYN needed  insurance would no cover per patient    Immunization, reviewed non epic    Hypertension  No added salt diet, do not and salt to your food  Try to exercise every other day for 30 minutes  Continue current medications    Tobacco abuse   Will need to stop smoking immediately   Consulted at great length greater than 10 min  Patient is not ready to quite  Problem List Items Addressed This Visit    None  Visit Diagnoses       Acute bronchitis, unspecified organism    -  Primary    Relevant Medications    azithromycin (Zithromax) 250 mg tablet              Desmond Medina MD

## 2025-02-18 ENCOUNTER — OFFICE VISIT (OUTPATIENT)
Dept: URGENT CARE | Age: 65
End: 2025-02-18
Payer: COMMERCIAL

## 2025-02-18 ENCOUNTER — ANCILLARY PROCEDURE (OUTPATIENT)
Dept: URGENT CARE | Age: 65
End: 2025-02-18
Payer: COMMERCIAL

## 2025-02-18 VITALS
WEIGHT: 127 LBS | OXYGEN SATURATION: 95 % | DIASTOLIC BLOOD PRESSURE: 81 MMHG | TEMPERATURE: 97.1 F | HEART RATE: 84 BPM | SYSTOLIC BLOOD PRESSURE: 134 MMHG | BODY MASS INDEX: 21.8 KG/M2 | RESPIRATION RATE: 17 BRPM

## 2025-02-18 DIAGNOSIS — S80.02XA CONTUSION OF LEFT KNEE, INITIAL ENCOUNTER: Primary | ICD-10-CM

## 2025-02-18 DIAGNOSIS — M25.562 ACUTE PAIN OF LEFT KNEE: ICD-10-CM

## 2025-02-18 PROCEDURE — 73562 X-RAY EXAM OF KNEE 3: CPT | Mod: LEFT SIDE | Performed by: REGISTERED NURSE

## 2025-02-18 ASSESSMENT — ENCOUNTER SYMPTOMS: ARTHRALGIAS: 1

## 2025-02-18 NOTE — LETTER
February 18, 2025     Patient: Vandana Woods   YOB: 1960   Date of Visit: 2/18/2025       To Whom It May Concern:    It is my medical opinion that Vandana Woods may return to work on 2/21/25 .    If you have any questions or concerns, please don't hesitate to call.         Sincerely,        Crystal L Severino, APRN-CNP    CC: No Recipients

## 2025-02-18 NOTE — PROGRESS NOTES
Subjective   Patient ID: Vandana Woods is a 64 y.o. female. They present today with a chief complaint of Fall Injury (Fell on ice at 7AM this morning, when she fell she landed on her left knee, did not hit her head, no thinners. She is able to put a little weight on it, she is able to bend her knee.).    History of Present Illness    History provided by:  Patient  Injury  Location:  Left knee  Quality:  Aching, throbbing  Severity:  Moderate  Onset quality:  Sudden  Duration:  1 hour  Timing:  Constant  Progression:  Worsening  Chronicity:  New  Context:  Fell on ice landing on left knee  Relieved by:  Rest  Worsened by:  Movement, weight bearing and ambulation  Ineffective treatments:  None tried      Past Medical History  Allergies as of 02/18/2025 - Reviewed 02/18/2025   Allergen Reaction Noted    Cefaclor Rash 10/26/2012       (Not in a hospital admission)       Past Medical History:   Diagnosis Date    Abnormal findings on diagnostic imaging of other specified body structures 04/15/2014    Abnormal MRI    Altered mental status, unspecified 06/20/2016    Abnormal mental state    Disease of spinal cord, unspecified     Myelopathy    Encounter for screening, unspecified     Screening due    Epidermal cyst     Epidermal cyst of ear    Other bursitis, not elsewhere classified, unspecified site 06/30/2017    Traumatic bursitis    Other conditions influencing health status     Herniated disc    Other specified noninfective gastroenteritis and colitis     Lymphocytic colitis    Pain in right wrist 06/30/2017    Wrist pain, acute, right    Pain in throat 06/27/2014    Pain in throat    Personal history of other diseases of the digestive system     History of irritable bowel syndrome    Personal history of other diseases of the musculoskeletal system and connective tissue     History of low back pain    Personal history of other diseases of the musculoskeletal system and connective tissue     History of low back pain     Personal history of other diseases of the musculoskeletal system and connective tissue     History of radiculopathy    Personal history of other diseases of the musculoskeletal system and connective tissue     Personal history of fibromyalgia    Personal history of other diseases of the musculoskeletal system and connective tissue     Personal history of osteoporosis    Personal history of other diseases of the nervous system and sense organs     History of neuropathy    Personal history of other diseases of the nervous system and sense organs 03/28/2016    History of impacted cerumen    Personal history of other diseases of the respiratory system     History of acute bronchitis    Personal history of other diseases of the respiratory system     History of chronic obstructive lung disease    Personal history of other diseases of the respiratory system     History of allergic rhinitis    Personal history of other diseases of the respiratory system     Personal history of sinusitis    Personal history of other diseases of the respiratory system     Personal history of asthma    Personal history of other diseases of the respiratory system 10/26/2015    History of acute bronchitis    Personal history of other diseases of the respiratory system 10/21/2015    History of sore throat    Personal history of other endocrine, nutritional and metabolic disease     History of hyperlipidemia    Personal history of other endocrine, nutritional and metabolic disease     History of hypothyroidism    Personal history of other specified conditions     History of diarrhea    Personal history of other specified conditions 02/26/2014    History of headache    Personal history of other specified conditions 06/27/2014    History of dizziness       Past Surgical History:   Procedure Laterality Date    COLONOSCOPY  02/07/2014    Colonoscopy (Fiberoptic)    CT ANGIO NECK  4/29/2014    CT NECK ANGIO W AND WO IV CONTRAST [facility]     MR HEAD ANGIO WO IV CONTRAST  4/30/2014    MR HEAD ANGIO WO IV CONTRAST [facility]    OTHER SURGICAL HISTORY  03/15/2021    Gallbladder surgery        reports that she has been smoking cigarettes. She has never been exposed to tobacco smoke. She has never used smokeless tobacco. She reports current alcohol use. She reports that she does not use drugs.    Review of Systems  Review of Systems   Musculoskeletal:  Positive for arthralgias (left knee pain and swelling).   All other systems reviewed and are negative.                                 Objective    Vitals:    02/18/25 1000   BP: 134/81   BP Location: Left arm   Patient Position: Sitting   BP Cuff Size: Adult long   Pulse: 84   Resp: 17   Temp: 36.2 °C (97.1 °F)   TempSrc: Temporal   SpO2: 95%   Weight: 57.6 kg (127 lb)     No LMP recorded. Patient is postmenopausal.    Physical Exam  Vitals and nursing note reviewed.   Musculoskeletal:      Left knee: Swelling and erythema present. No bony tenderness. Decreased range of motion. Tenderness present.         Procedures    Point of Care Test & Imaging Results from this visit  No results found for this visit on 02/18/25.   No results found.    Diagnostic study results (if any) were reviewed by Crystal L Severino, APRN-CNP.    Assessment/Plan   Allergies, medications, history, and pertinent labs/EKGs/Imaging reviewed by Crystal L Severino, APRN-CNP.     Medical Decision Making  Patient presents with chief complaint of pain and swelling to her left knee.  Patient states this morning around 7 AM she slipped on some ice and landed on her left knee.  She denies hitting her back or head and has no pain elsewhere.  She states she is having difficulty with bearing weight and ambulation but most of her pain comes from flexion and extension.  Left knee x-rays obtained; and is negative for any acute fractures or dislocations.  At time of discharge patient was clinically well-appearing and HDS for outpatient  management. The patient and/or family was educated regarding diagnosis, supportive care, OTC and Rx medications. The patient and/or family was given the opportunity to ask questions prior to discharge.  They verbalized understanding of my discussion of the plans for treatment, expected course, indications to return to  or seek further evaluation in ED, and the need for timely follow up as directed.   They were provided with a work/school excuse if requested.      Orders and Diagnoses  Diagnoses and all orders for this visit:  Acute pain of left knee  -     XR knee left 3 views; Future  RICE  Tylenol/ibuprofen as needed for pain/discomfort/fever      Medical Admin Record      Patient disposition: Home    Electronically signed by Crystal L Severino, APRN-CNP  10:10 AM

## 2025-02-28 ENCOUNTER — TELEMEDICINE (OUTPATIENT)
Dept: PRIMARY CARE | Facility: CLINIC | Age: 65
End: 2025-02-28
Payer: COMMERCIAL

## 2025-02-28 DIAGNOSIS — R09.82 POSTNASAL DRIP: Primary | ICD-10-CM

## 2025-02-28 DIAGNOSIS — J01.00 ACUTE NON-RECURRENT MAXILLARY SINUSITIS: ICD-10-CM

## 2025-02-28 PROCEDURE — 99213 OFFICE O/P EST LOW 20 MIN: CPT | Performed by: INTERNAL MEDICINE

## 2025-02-28 PROCEDURE — 99406 BEHAV CHNG SMOKING 3-10 MIN: CPT | Performed by: INTERNAL MEDICINE

## 2025-02-28 ASSESSMENT — ENCOUNTER SYMPTOMS
COUGH: 1
HEADACHES: 1

## 2025-02-28 NOTE — PROGRESS NOTES
Subjective   Vandana Woods is a 64 y.o. female who presents for virtual visit complaining of postnasal drip     History of present illness  Vandana is a 64 female with a history of hypothyroidism, tobacco abuse, complaining of postnasal drip, clear no fever no chills no bodyaches, completed antibiotic therapy improved but she is annoyed by her postnasal drip she is mostly outdoors she is on Claritin daily without improvement, discharge is clear.     ROS:  10 systems are pertinent as above    10 systems reviewed pertinent as above  Objective   Virtual visit  There were no vitals taken for this visit.     Physical Exam  Virtual visit    Assessment/Plan     Postnasal drip  I suspect allergies  There is no fever or chills  Discharge is clear  I have suggested add Flonase twice daily  1 puff in each nostril twice daily  Spoke about tobacco abuse   stop smoking consulted patient 3 to 10 minutes  Patient will try her best    History of lower back pain  Stable at this time    Declined CT Lung Cancer screening     Hypothyroidism  Continue current medications  Report any changes such as weight gain or weight loss  Continue to exercise regularly  Discussed new dose of Synthroid of 112 mcg  Patient will come in in 4 weeks for a new TSH level    Tobacco abuse  Discussed smoking cessation with patient  Patient is not ready to quit smoking at this time      Problem List Items Addressed This Visit       Postnasal drip - Primary    Acute non-recurrent maxillary sinusitis             Desmond Medina MD       Answers submitted by the patient for this visit:  Cough Questionnaire (Submitted on 2/28/2025)  Chief Complaint: Cough  Chronicity: recurrent  Onset: 1 to 4 weeks ago  Progression since onset: unchanged  Frequency: hourly  Cough characteristics: non-productive  headaches: Yes

## 2025-05-04 NOTE — PROGRESS NOTES
Subjective   Vandana Woods is a 64 y.o. female who presents for physical exam fasting blood work .    Vandana is a 64 female with a history of hypothyroidism, tobacco abuse, complaining of sinus congestion for the last 2 weeks pressure in the sinus cavity, discharge clear discharge, occasional cough without fever chills no body aches no nausea vomiting no diaphoresis no abdominal pain no constipation diarrhea. On  Synthroid dose was decreased to 112 mcg daily, she is feeling fine on this new dose. Here for a physical exam fasting blood test and physical exam.     ROS:  10 systems are pertinent as above    Objective     Visit Vitals  /82   Pulse 78   Temp 36.5 °C (97.7 °F)   Resp 14        Physical Exam    HEENT: Atraumatic normocephalic the pupils are equal and round and reactive to light the sclerae nonicteric extraocular motion are intact.  Neck: Is supple without JVD no carotid bruits the trachea is midline there are no masses pulses are equal and bilateral with normal upstroke.  Skin: Normal.  Skin good texture.  Moist.  Good turgor.  No lesions, no rashes.  Lymph: No lymphadenopathy appreciated, no masses, no lesions  Lungs: Are clear to auscultation and percussion, good breath sounds bilaterally, no rhonchi, no wheezing, good diaphragmatic excursion.  Heart: Normal rate and normal rhythm S1, S2, no S3, no gallop, murmur or rub.  Abdomen: Soft, nontender, no organomegaly, good bowel sounds.    Extremities: Full range of motion, good pulses bilateral.  No cyanosis, no clubbing or edema.  Neuro: Cranial nerves II-XII are grossly intact there is no sensory or motor deficits.  Able to move all extremities.      Assessment/Plan     Patient is here for physical exam     Fasting blood work    Cbc bmp lipid ast alt vit d  TSH    Immunizations  Corona Vax declined  Flu 10/2024  Pneumovax needed     Prevention  Colonoscopy decline future  Mammogram  nov 2024    Gyn  Follows with Dr Aiken at [facility]    Declined CT Lung  Cancer screening  declined at this time    Hypothyroidism  Continue current medications  Report any changes such as weight gain or weight loss  Continue to exercise regularly  Discussed new dose of Synthroid of 112 mcg  Patient will come in in 4 weeks for a new TSH level    Tobacco abuse  Discussed smoking cessation with patient  Patient is not ready to quit smoking at this time      Problem List Items Addressed This Visit       Benign essential hypertension    Relevant Medications    triamterene-hydrochlorothiazid (Dyazide) 37.5-25 mg capsule    Hypothyroidism    Relevant Medications    levothyroxine (Synthroid, Levoxyl) 112 mcg tablet    Encounter for screening and preventative care - Primary    Relevant Orders    CBC w/5 Part Differential, [facility]    Acute bronchitis    Relevant Medications    azithromycin (Zithromax) 250 mg tablet    azithromycin (Zithromax) 250 mg tablet    Primary insomnia    Relevant Medications    mirtazapine (Remeron) 15 mg tablet    Seasonal allergic reaction    Relevant Medications    montelukast (Singulair) 10 mg tablet    loratadine (Claritin) 10 mg tablet    Acute non-recurrent maxillary sinusitis    Relevant Medications    azithromycin (Zithromax) 250 mg tablet    azithromycin (Zithromax) 250 mg tablet           Desmond Medina MD

## 2025-05-05 ENCOUNTER — APPOINTMENT (OUTPATIENT)
Dept: OTOLARYNGOLOGY | Facility: CLINIC | Age: 65
End: 2025-05-05
Payer: COMMERCIAL

## 2025-05-05 ENCOUNTER — OFFICE VISIT (OUTPATIENT)
Dept: PRIMARY CARE | Facility: CLINIC | Age: 65
End: 2025-05-05
Payer: COMMERCIAL

## 2025-05-05 VITALS
WEIGHT: 125 LBS | HEIGHT: 64 IN | SYSTOLIC BLOOD PRESSURE: 122 MMHG | DIASTOLIC BLOOD PRESSURE: 82 MMHG | TEMPERATURE: 97.7 F | HEART RATE: 78 BPM | BODY MASS INDEX: 21.34 KG/M2 | RESPIRATION RATE: 14 BRPM

## 2025-05-05 VITALS — WEIGHT: 128 LBS | BODY MASS INDEX: 21.85 KG/M2 | HEIGHT: 64 IN

## 2025-05-05 DIAGNOSIS — E03.9 HYPOTHYROIDISM, UNSPECIFIED TYPE: ICD-10-CM

## 2025-05-05 DIAGNOSIS — J30.2 SEASONAL ALLERGIES: ICD-10-CM

## 2025-05-05 DIAGNOSIS — F17.200 TOBACCO DEPENDENCE: ICD-10-CM

## 2025-05-05 DIAGNOSIS — E78.5 DYSLIPIDEMIA: ICD-10-CM

## 2025-05-05 DIAGNOSIS — I10 BENIGN ESSENTIAL HYPERTENSION: ICD-10-CM

## 2025-05-05 DIAGNOSIS — J20.9 ACUTE BRONCHITIS, UNSPECIFIED ORGANISM: ICD-10-CM

## 2025-05-05 DIAGNOSIS — H61.20 CERUMEN IN AUDITORY CANAL ON EXAMINATION: Primary | ICD-10-CM

## 2025-05-05 DIAGNOSIS — Z12.31 ENCOUNTER FOR SCREENING MAMMOGRAM FOR MALIGNANT NEOPLASM OF BREAST: ICD-10-CM

## 2025-05-05 DIAGNOSIS — J30.2 SEASONAL ALLERGIC REACTION: ICD-10-CM

## 2025-05-05 DIAGNOSIS — F51.01 PRIMARY INSOMNIA: ICD-10-CM

## 2025-05-05 DIAGNOSIS — K21.9 LARYNGOPHARYNGEAL REFLUX: ICD-10-CM

## 2025-05-05 DIAGNOSIS — R09.A2 GLOBUS PHARYNGEUS: ICD-10-CM

## 2025-05-05 DIAGNOSIS — Z00.00 ENCOUNTER FOR SCREENING AND PREVENTATIVE CARE: Primary | ICD-10-CM

## 2025-05-05 DIAGNOSIS — J01.00 ACUTE NON-RECURRENT MAXILLARY SINUSITIS: ICD-10-CM

## 2025-05-05 LAB
ALT SERPL W P-5'-P-CCNC: 27 U/L (ref 16–63)
AST SERPL W P-5'-P-CCNC: 23 U/L (ref 15–37)
BASOPHILS # BLD AUTO: 0.01 X10*3/UL (ref 0.1–1.6)
BASOPHILS NFR BLD AUTO: 0.21 % (ref 0–0.3)
CHOLEST SERPL-MCNC: 144 MG/DL (ref 0–199)
CHOLESTEROL/HDL RATIO: 2.9 (ref 4.2–7)
EOSINOPHIL # BLD AUTO: 0.03 X10*3/UL (ref 0.04–0.5)
EOSINOPHIL NFR BLD AUTO: 0.57 % (ref 0.7–7)
ERYTHROCYTE [DISTWIDTH] IN BLOOD BY AUTOMATED COUNT: 12.8 % (ref 11.5–14.5)
HCT VFR BLD AUTO: 45.9 % (ref 36.6–46.6)
HDLC SERPL-MCNC: 49 MG/DL (ref 40–59)
HGB BLD-MCNC: 16.86 G/DL (ref 12–15.4)
IS PATIENT FASTING: YES
LDLC SERPL DIRECT ASSAY-MCNC: 88 MG/DL (ref 0–100)
LYMPHOCYTES # BLD AUTO: 2.03 X10*3/UL (ref 0–6)
LYMPHOCYTES NFR BLD AUTO: 35.04 % (ref 20.5–51.1)
MCH RBC QN AUTO: 35.5 PG (ref 26–32)
MCHC RBC AUTO-ENTMCNC: 36.7 G/DL (ref 31–38)
MCV RBC AUTO: 96.7 FL (ref 80–96)
MONOCYTES # BLD AUTO: 0.42 X10*3/UL (ref 1.6–24.9)
MONOCYTES NFR BLD AUTO: 7.18 % (ref 1.7–9.3)
NEUTROPHILS # BLD AUTO: 3.31 X10*3/UL (ref 1.4–6.5)
NEUTROPHILS NFR BLD AUTO: 57 % (ref 42.2–75.2)
PLATELET # BLD AUTO: 222.1 X10*3/UL (ref 150–450)
PMV BLD AUTO: 8.54 FL (ref 7.8–11)
RBC # BLD AUTO: 4.75 X10*6/UL (ref 3.9–5.3)
TRIGL SERPL-MCNC: 80 MG/DL
TSH SERPL-ACNC: 1.63 MIU/L (ref 0.44–3.98)
WBC # BLD AUTO: 5.8 X10*3/UL (ref 4.5–10.5)

## 2025-05-05 PROCEDURE — 84443 ASSAY THYROID STIM HORMONE: CPT | Performed by: INTERNAL MEDICINE

## 2025-05-05 PROCEDURE — 3008F BODY MASS INDEX DOCD: CPT | Performed by: INTERNAL MEDICINE

## 2025-05-05 PROCEDURE — 3074F SYST BP LT 130 MM HG: CPT | Performed by: INTERNAL MEDICINE

## 2025-05-05 PROCEDURE — 3008F BODY MASS INDEX DOCD: CPT | Performed by: OTOLARYNGOLOGY

## 2025-05-05 PROCEDURE — 3079F DIAST BP 80-89 MM HG: CPT | Performed by: INTERNAL MEDICINE

## 2025-05-05 PROCEDURE — 69210 REMOVE IMPACTED EAR WAX UNI: CPT | Performed by: OTOLARYNGOLOGY

## 2025-05-05 PROCEDURE — 85025 COMPLETE CBC W/AUTO DIFF WBC: CPT | Performed by: INTERNAL MEDICINE

## 2025-05-05 PROCEDURE — 84460 ALANINE AMINO (ALT) (SGPT): CPT | Performed by: INTERNAL MEDICINE

## 2025-05-05 PROCEDURE — 31575 DIAGNOSTIC LARYNGOSCOPY: CPT | Performed by: OTOLARYNGOLOGY

## 2025-05-05 PROCEDURE — 80061 LIPID PANEL: CPT | Performed by: INTERNAL MEDICINE

## 2025-05-05 PROCEDURE — 84450 TRANSFERASE (AST) (SGOT): CPT | Performed by: INTERNAL MEDICINE

## 2025-05-05 PROCEDURE — 99213 OFFICE O/P EST LOW 20 MIN: CPT | Performed by: OTOLARYNGOLOGY

## 2025-05-05 PROCEDURE — 99396 PREV VISIT EST AGE 40-64: CPT | Performed by: INTERNAL MEDICINE

## 2025-05-05 RX ORDER — AZITHROMYCIN 250 MG/1
TABLET, FILM COATED ORAL
Qty: 6 TABLET | Refills: 0 | COMMUNITY
Start: 2025-05-05 | End: 2025-05-05

## 2025-05-05 RX ORDER — AZITHROMYCIN 250 MG/1
TABLET, FILM COATED ORAL
Qty: 6 TABLET | Refills: 1 | COMMUNITY
Start: 2025-05-05 | End: 2025-05-05

## 2025-05-05 RX ORDER — MONTELUKAST SODIUM 10 MG/1
10 TABLET ORAL DAILY
Qty: 90 TABLET | Refills: 3 | Status: SHIPPED | OUTPATIENT
Start: 2025-05-05 | End: 2026-05-05

## 2025-05-05 RX ORDER — LEVOTHYROXINE SODIUM 112 UG/1
112 TABLET ORAL DAILY
Qty: 90 TABLET | Refills: 3 | Status: SHIPPED | OUTPATIENT
Start: 2025-05-05 | End: 2026-05-05

## 2025-05-05 RX ORDER — LORATADINE 10 MG/1
10 TABLET ORAL DAILY
Qty: 90 TABLET | Refills: 3 | Status: SHIPPED | OUTPATIENT
Start: 2025-05-05 | End: 2026-05-05

## 2025-05-05 RX ORDER — MIRTAZAPINE 15 MG/1
15 TABLET, FILM COATED ORAL NIGHTLY
Qty: 90 TABLET | Refills: 3 | Status: SHIPPED | OUTPATIENT
Start: 2025-05-05 | End: 2026-05-05

## 2025-05-05 RX ORDER — TRIAMTERENE AND HYDROCHLOROTHIAZIDE 37.5; 25 MG/1; MG/1
1 CAPSULE ORAL DAILY
Qty: 90 CAPSULE | Refills: 3 | Status: SHIPPED | OUTPATIENT
Start: 2025-05-05 | End: 2026-05-05

## 2025-05-05 ASSESSMENT — PAIN SCALES - GENERAL: PAINLEVEL_OUTOF10: 0-NO PAIN

## 2025-05-05 ASSESSMENT — ENCOUNTER SYMPTOMS
DEPRESSION: 0
OCCASIONAL FEELINGS OF UNSTEADINESS: 0
LOSS OF SENSATION IN FEET: 0

## 2025-05-05 NOTE — PROGRESS NOTES
NATHANIEL  Vandana Woods is a 64 y.o. female here for semiannual cerumen management.  Denies otalgia and otorrhea.  Generally normal hearing at baseline.  She does have itching but does not like to put anything in her ear so does not want drops.    She is a smoker and has globus sensation, throat clearing, postnasal drainage.  These are fairly chronic symptoms for months or more but she has not brought up in the past.  She brings this up today separately from her ear.  She does not have eligio heartburn and is not on antiacid      Past Medical History:   Diagnosis Date    Abnormal findings on diagnostic imaging of other specified body structures 04/15/2014    Abnormal MRI    Altered mental status, unspecified 06/20/2016    Abnormal mental state    Disease of spinal cord, unspecified     Myelopathy    Encounter for screening, unspecified     Screening due    Epidermal cyst     Epidermal cyst of ear    Other bursitis, not elsewhere classified, unspecified site 06/30/2017    Traumatic bursitis    Other conditions influencing health status     Herniated disc    Other specified noninfective gastroenteritis and colitis     Lymphocytic colitis    Pain in right wrist 06/30/2017    Wrist pain, acute, right    Pain in throat 06/27/2014    Pain in throat    Personal history of other diseases of the digestive system     History of irritable bowel syndrome    Personal history of other diseases of the musculoskeletal system and connective tissue     History of low back pain    Personal history of other diseases of the musculoskeletal system and connective tissue     History of low back pain    Personal history of other diseases of the musculoskeletal system and connective tissue     History of radiculopathy    Personal history of other diseases of the musculoskeletal system and connective tissue     Personal history of fibromyalgia    Personal history of other diseases of the musculoskeletal system and connective tissue     Personal  history of osteoporosis    Personal history of other diseases of the nervous system and sense organs     History of neuropathy    Personal history of other diseases of the nervous system and sense organs 03/28/2016    History of impacted cerumen    Personal history of other diseases of the respiratory system     History of acute bronchitis    Personal history of other diseases of the respiratory system     History of chronic obstructive lung disease    Personal history of other diseases of the respiratory system     History of allergic rhinitis    Personal history of other diseases of the respiratory system     Personal history of sinusitis    Personal history of other diseases of the respiratory system     Personal history of asthma    Personal history of other diseases of the respiratory system 10/26/2015    History of acute bronchitis    Personal history of other diseases of the respiratory system 10/21/2015    History of sore throat    Personal history of other endocrine, nutritional and metabolic disease     History of hyperlipidemia    Personal history of other endocrine, nutritional and metabolic disease     History of hypothyroidism    Personal history of other specified conditions     History of diarrhea    Personal history of other specified conditions 02/26/2014    History of headache    Personal history of other specified conditions 06/27/2014    History of dizziness            Medications:     Current Outpatient Medications:     triamterene-hydrochlorothiazid (Dyazide) 37.5-25 mg capsule, Take 1 capsule by mouth once daily., Disp: 30 capsule, Rfl: 11    levothyroxine (Synthroid, Levoxyl) 112 mcg tablet, Take 1 tablet (112 mcg) by mouth once daily., Disp: 90 tablet, Rfl: 3    levothyroxine (Synthroid, Levoxyl) 112 mcg tablet, Take 1 tablet (112 mcg) by mouth once daily., Disp: 30 tablet, Rfl: 11    levothyroxine (Synthroid, Levoxyl) 125 mcg tablet, Take 125 mcg by mouth once daily., Disp: , Rfl:      "levothyroxine (Synthroid, Levoxyl) 125 mcg tablet, Take 1 tablet (125 mcg) by mouth once daily., Disp: 30 tablet, Rfl: 11    loratadine (Claritin) 10 mg tablet, Take 10 mg by mouth once daily., Disp: , Rfl:     montelukast (Singulair) 10 mg tablet, Take 1 tablet (10 mg) by mouth once daily., Disp: 90 tablet, Rfl: 3    montelukast (Singulair) 10 mg tablet, Take 10 mg by mouth once daily., Disp: , Rfl:     montelukast (Singulair) 10 mg tablet, Take 1 tablet (10 mg) by mouth once daily., Disp: 30 tablet, Rfl: 11    triamterene-hydrochlorothiazid (Dyazide) 37.5-25 mg capsule, Take 1 capsule by mouth once daily., Disp: , Rfl:     triamterene-hydrochlorothiazid (Dyazide) 37.5-25 mg capsule, Take 1 capsule by mouth once daily., Disp: 30 capsule, Rfl: 11     Allergies:  Allergies   Allergen Reactions    Cefaclor Rash        Physical Exam:  Last Recorded Vitals  Height 1.626 m (5' 4\"), weight 58.1 kg (128 lb).  General:     General appearance: Well-developed, well-nourished in no acute distress.       Voice:  normal       Head/face: Normal appearance; nontender to palpation     Facial nerve function: Normal and symmetric bilaterally.    Oral/oropharynx:     Oral vestibule: Normal labial and gingival mucosa     Tongue/floor of mouth: Normal without lesion     Oropharynx: Clear.  No lesions present of the hard/soft palate, posterior pharynx    Neck:     Neck: Normal appearance, trachea midline     Salivary glands: Normal to palpation bilaterally     Lymph nodes: No cervical lymphadenopathy to palpation     Thyroid: No thyromegaly.  No palpable nodules     Range of motion: Normal    Neurological:     Cortical functions: Alert and oriented x3, appropriate affect       Larynx/hypopharynx:     Laryngeal findings: Mirror exam inadequate or limited secondary to enlarged base of tongue and/or excessive gagging.  Flexible laryngoscopy performed secondary to inadequate mirror examination.  Verbal informed consent the flexible " laryngoscope was passed through the nasal cavity.  Normal epiglottis, aryepiglottic folds, arytenoids, false vocal cords, true vocal cords.  Normal vocal cord mobility bilaterally was identified.  No mucosal masses or lesions.  Generalized erythema    Nasopharynx:     Nasopharynx: Normal    Ear:     Ear canal: Normal bilaterally after suctioning moderately occlusive cerumen bilaterally and desquamation more from the left than the right     Tympanic membrane: Intact and mobile bilaterally     Pinna: Normal bilaterally     Hearing:  Gross hearing assessment normal by voice    Nose:     Visualized using: Anterior rhinoscopy     Nasopharynx: Inadequate mirror exam secondary to gag, anatomy.       Nasal dorsum: Nontraumatic midline appearance     Septum: Midline     Inferior turbinates: Normally sized     Mucosa: Bilateral, pink, normal appearing       ASSESSMENT/PLAN:  Ears cleaned bilaterally.  Relief.  Recheck 6 months, sooner as needed.  Regarding her throat symptoms, she does not have any suspicious findings.  Smoking cessation was of course recommended and she is working on decreasing.  Recommend trial of antiacid for 6 weeks as well.  She may call us if symptoms change or worsen or do not respond.      Sean German MD

## 2025-05-09 ENCOUNTER — TELEPHONE (OUTPATIENT)
Dept: PRIMARY CARE | Facility: CLINIC | Age: 65
End: 2025-05-09
Payer: COMMERCIAL

## 2025-05-22 ENCOUNTER — HOSPITAL ENCOUNTER (OUTPATIENT)
Dept: RADIOLOGY | Facility: HOSPITAL | Age: 65
Discharge: HOME | End: 2025-05-22
Payer: COMMERCIAL

## 2025-05-22 VITALS — HEIGHT: 64 IN | BODY MASS INDEX: 21.34 KG/M2 | WEIGHT: 125 LBS

## 2025-05-22 DIAGNOSIS — Z12.31 ENCOUNTER FOR SCREENING MAMMOGRAM FOR MALIGNANT NEOPLASM OF BREAST: ICD-10-CM

## 2025-05-22 PROCEDURE — 77067 SCR MAMMO BI INCL CAD: CPT

## 2025-11-10 ENCOUNTER — APPOINTMENT (OUTPATIENT)
Dept: OTOLARYNGOLOGY | Facility: CLINIC | Age: 65
End: 2025-11-10
Payer: COMMERCIAL